# Patient Record
Sex: MALE | Race: WHITE
[De-identification: names, ages, dates, MRNs, and addresses within clinical notes are randomized per-mention and may not be internally consistent; named-entity substitution may affect disease eponyms.]

---

## 2019-09-11 ENCOUNTER — HOSPITAL ENCOUNTER (OUTPATIENT)
Dept: HOSPITAL 5 - GIO | Age: 69
Discharge: HOME | End: 2019-09-11
Attending: INTERNAL MEDICINE
Payer: OTHER GOVERNMENT

## 2019-09-11 VITALS — DIASTOLIC BLOOD PRESSURE: 73 MMHG | SYSTOLIC BLOOD PRESSURE: 120 MMHG

## 2019-09-11 DIAGNOSIS — Z88.0: ICD-10-CM

## 2019-09-11 DIAGNOSIS — Z79.899: ICD-10-CM

## 2019-09-11 DIAGNOSIS — K21.0: ICD-10-CM

## 2019-09-11 DIAGNOSIS — Z80.42: ICD-10-CM

## 2019-09-11 DIAGNOSIS — Z12.11: Primary | ICD-10-CM

## 2019-09-11 DIAGNOSIS — K29.50: ICD-10-CM

## 2019-09-11 DIAGNOSIS — B96.81: ICD-10-CM

## 2019-09-11 DIAGNOSIS — K57.30: ICD-10-CM

## 2019-09-11 DIAGNOSIS — E78.5: ICD-10-CM

## 2019-09-11 DIAGNOSIS — K64.8: ICD-10-CM

## 2019-09-11 DIAGNOSIS — I10: ICD-10-CM

## 2019-09-11 PROCEDURE — 88305 TISSUE EXAM BY PATHOLOGIST: CPT

## 2019-09-11 PROCEDURE — 88342 IMHCHEM/IMCYTCHM 1ST ANTB: CPT

## 2019-09-11 NOTE — HISTORY AND PHYSICAL REPORT
HISTORY OF PRESENT ILLNESS:  This is a 69-year-old -American gentleman

with an underlying history of hypertension and hyperlipidemia, who is to have a

colonoscopy done as part of colon polyp screening.  He does give a family

history of cancer.  His father had prostate cancer.  In addition, he has been

having GERD symptoms and is to have an EGD and a colonoscopy done today.



ALLERGIES:  He has a history of allergy to PENICILLIN.



SOCIAL HISTORY:  Denies history of smoking, occasionally drinks alcohol, no

cardiac issues.  He has had his flu shots.



MEDICATIONS:  Include amlodipine, trazodone, simvastatin, vitamins 



PHYSICAL EXAMINATION:

VITAL SIGNS:  He is afebrile.  Blood pressure 127/76, pulse is 64.  Height is 5

feet 8 inches, weight is 217-1/2 pounds.

HEENT:  Shows no JVD.

LUNGS:  Clear to auscultation.

CARDIOVASCULAR:  Normal.

ABDOMEN:  Shows some epigastric tenderness.  Bowel sounds present.

EXTREMITIES:  No pedal edema.

NEUROLOGIC:  He is alert and oriented.



ASSESSMENT:  Colon polyp screening, gastroesophageal reflux disease,

esophagitis, hypertension, hyperlipidemia.



PLAN:  Plan is to do an EGD and a colonoscopy at [facility] on 09/11/2019.





DD: 09/11/2019 12:35

DT: 09/11/2019 12:42

JOB# [id number]  [id number]

JANETTE/ANKUSH OCONNELL

## 2019-09-11 NOTE — OPERATIVE REPORT
PROCEDURE:  Esophagogastroduodenoscopy with biopsy.



INDICATIONS:  A 69-year-old -American gentleman with an underlying

history of hypertension and hyperlipidemia who has been having GERD symptoms. 

EGD was done to assess for any significant upper GI pathology.



DESCRIPTION OF PROCEDURE:  The procedure was done after getting informed consent

with MAC anesthesia.  Instrument was passed through the hypopharynx into the

esophagus, which showed mild-to-moderate distal erosive esophagitis.  There was

some gastritis and gastric erosion noted in the antral portion of the stomach. 

Biopsy was done from the distal esophagus and additional biopsy was done from

the gastric body, gastric antrum and angular incisura.  The pylorus was patent. 

The duodenum in the first and second portion appeared normal.



ASSESSMENT:  Gastroesophageal reflux disease, esophagitis, gastric erosion,

gastritis.



Plan is to treat the patient with PPI, have the patient to avoid aspirin and

aspirin-related products for the next few days, have the patient follow up in

the office in about a week's time.  A colonoscopy is to be done for further

assessment of as part of colon polyp screening.  The patient will be treated

with PPI, asked to follow up in the office and to avoid aspirin and

aspirin-related products.  Procedure was done in the GI lab with assistance of

the GI lab team, which included RN, Elza Owen, as well as Grecia mix as

well as with the assistance of anesthesia.





DD: 09/11/2019 13:07

DT: 09/11/2019 14:46

JOB# [id number]  [id number]

JANETTE/ANKUSH

## 2019-09-11 NOTE — PROCEDURE NOTE
Date of procedure: 09/11/19


Pre-op diagnosis: GERD/Colon Polyp Screening


Post-op diagnosis: other (Esophagitis/Gastritis/Gastric Erosion/ No Colon Polyps

noted/Minor,Diverticuli/ Mild to moderate Internal Hemorrhoids)


Procedure: 





EGD with Biopsy and Colonoscopy


Anesthesia: MAC


Surgeon: TREMAINE DURHAM


Estimated blood loss: minimal


Pathology: list


Specimen disposition: to lab


Condition: stable


Disposition: same day (Avoid aspirin and NSAID for 4 days,otherwise resume home 

medication; treat with PPI and follow up in 1 to 2 weeks ([phone]).)

## 2019-09-11 NOTE — ANESTHESIA CONSULTATION
Anesthesia Consult and Med Hx


Date of service: 09/11/19





- Airway


Anesthetic Teeth Evaluation: Poor, Chipped


ROM Head & Neck: Adequate


Mental/Hyoid Distance: Adequate


Mallampati Class: Class II


Intubation Access Assessment: Probably Good





- Pre-Operative Health Status


ASA Pre-Surgery Classification: ASA3


Proposed Anesthetic Plan: MAC





- Pulmonary


Hx Sleep Apnea: Yes





- Cardiovascular System


Hx Hypertension: Yes





- Gastrointestinal


Hx Gastroesophageal Reflux Disease: Yes





- Other Systems


Hx Alcohol Use: Yes


Hx Substance Use: No


Hx Obesity: Yes

## 2019-09-11 NOTE — OPERATIVE REPORT
PROCEDURE:  Colonoscopy.



INDICATIONS:  This is a 69-year-old -American gentleman with an

underlying history of hypertension, hyperlipidemia, and family history of

prostate cancer.  He had some GERD symptoms.  EGD showed presence of

esophagitis, gastritis, and gastric erosion.  Colonoscopy was done as part of

colon polyp screening.



DESCRIPTION OF PROCEDURE:  Procedure was done after getting informed consent

with MAC anesthesia.  Initial rectal exam was unremarkable.  Instrument was

passed through the rectum onto the cecum, which was identified with ileocecal

valve and appendiceal orifice.  The scope was retroflexed in the cecum.  No

additional pathology was noted.  The scope was then straightened and withdrawn

to the hepatic flexure and reintroduced to the cecum.  No additional pathology

was noted in the cecum, ascending colon, transverse colon.  There were a few

minor diverticula noted in the left colon and the rectum showed mild to moderate

internal hemorrhoids.  There were no biopsies done.  No bleeding associated with

the procedure.  No colon polyps noted.



ASSESSMENT:  Colon polyp screening, no colon polyps' noted, minor diverticulum,

and mild to moderate internal hemorrhoid.  No bleeding associated with the

colonoscopy.  The patient was noted to have esophagitis, gastritis, gastric

erosion, will be treated with PPI.  I asked to avoid aspirin and aspirin-related

products because of the biopsies done during the EGD and to follow up in the

office in 1-2 weeks' time.  The patient may resume previous home medication, but

to avoid aspirin and aspirin-related products and to follow up in the office in

1-2 weeks' time.



The procedure was done in the GI lab with assistance of some anesthesia and

assistance of the GI lab team, which included RN, Elza Owen and Grecia mix.





DD: 09/11/2019 13:10

DT: 09/11/2019 14:20

JOB# [id number]  [id number]

JANETTE/ANKUSH